# Patient Record
Sex: FEMALE | Race: WHITE | NOT HISPANIC OR LATINO | URBAN - METROPOLITAN AREA
[De-identification: names, ages, dates, MRNs, and addresses within clinical notes are randomized per-mention and may not be internally consistent; named-entity substitution may affect disease eponyms.]

---

## 2017-02-23 ENCOUNTER — OUTPATIENT (OUTPATIENT)
Dept: OUTPATIENT SERVICES | Facility: HOSPITAL | Age: 52
LOS: 1 days | End: 2017-02-23

## 2017-02-27 DIAGNOSIS — Z01.20 ENCOUNTER FOR DENTAL EXAMINATION AND CLEANING WITHOUT ABNORMAL FINDINGS: ICD-10-CM

## 2017-05-12 ENCOUNTER — OUTPATIENT (OUTPATIENT)
Dept: OUTPATIENT SERVICES | Facility: HOSPITAL | Age: 52
LOS: 1 days | End: 2017-05-12

## 2017-05-18 DIAGNOSIS — Z01.20 ENCOUNTER FOR DENTAL EXAMINATION AND CLEANING WITHOUT ABNORMAL FINDINGS: ICD-10-CM

## 2017-07-06 ENCOUNTER — OUTPATIENT (OUTPATIENT)
Dept: OUTPATIENT SERVICES | Facility: HOSPITAL | Age: 52
LOS: 1 days | End: 2017-07-06

## 2017-07-07 DIAGNOSIS — Z01.20 ENCOUNTER FOR DENTAL EXAMINATION AND CLEANING WITHOUT ABNORMAL FINDINGS: ICD-10-CM

## 2020-07-09 ENCOUNTER — APPOINTMENT (OUTPATIENT)
Dept: HEMATOLOGY ONCOLOGY | Facility: CLINIC | Age: 55
End: 2020-07-09
Payer: MEDICARE

## 2020-07-09 ENCOUNTER — RESULT REVIEW (OUTPATIENT)
Age: 55
End: 2020-07-09

## 2020-07-09 VITALS
RESPIRATION RATE: 18 BRPM | WEIGHT: 93.2 LBS | HEIGHT: 62.4 IN | HEART RATE: 64 BPM | BODY MASS INDEX: 16.93 KG/M2 | SYSTOLIC BLOOD PRESSURE: 94 MMHG | OXYGEN SATURATION: 97 % | DIASTOLIC BLOOD PRESSURE: 55 MMHG | TEMPERATURE: 97.6 F

## 2020-07-09 DIAGNOSIS — Z80.52 FAMILY HISTORY OF MALIGNANT NEOPLASM OF BLADDER: ICD-10-CM

## 2020-07-09 DIAGNOSIS — Z82.49 FAMILY HISTORY OF ISCHEMIC HEART DISEASE AND OTHER DISEASES OF THE CIRCULATORY SYSTEM: ICD-10-CM

## 2020-07-09 DIAGNOSIS — T78.40XA ALLERGY, UNSPECIFIED, INITIAL ENCOUNTER: ICD-10-CM

## 2020-07-09 DIAGNOSIS — Z87.891 PERSONAL HISTORY OF NICOTINE DEPENDENCE: ICD-10-CM

## 2020-07-09 DIAGNOSIS — Z00.00 ENCOUNTER FOR GENERAL ADULT MEDICAL EXAMINATION W/OUT ABNORMAL FINDINGS: ICD-10-CM

## 2020-07-09 DIAGNOSIS — Z80.8 FAMILY HISTORY OF MALIGNANT NEOPLASM OF OTHER ORGANS OR SYSTEMS: ICD-10-CM

## 2020-07-09 DIAGNOSIS — Z82.0 FAMILY HISTORY OF EPILEPSY AND OTHER DISEASES OF THE NERVOUS SYSTEM: ICD-10-CM

## 2020-07-09 PROCEDURE — 99205 OFFICE O/P NEW HI 60 MIN: CPT

## 2020-07-13 DIAGNOSIS — R92.2 INCONCLUSIVE MAMMOGRAM: ICD-10-CM

## 2020-07-24 NOTE — ASSESSMENT
[FreeTextEntry1] : 54 year old female diagnosed with CLL stage "0" with average count of 35k, denies fever, occasional night sweats, most likely menopausal, weigh decline intermittently but variable due to her eating disorder.\par \par She c/o fatigue, feels depressed. \par \par She gets bouts of nausea, vertigo since 2014. \par Plan:\par - CBC, immunoglobulins, Copper and ZInc levels.

## 2020-07-24 NOTE — HISTORY OF PRESENT ILLNESS
[Disease:__________________________] : Disease: [unfilled] [de-identified] : 54 year old female presents today for initial consultation of CLL, transfer of care from Dr. Mcguire.  Patient was diagnosed with CLL, Gomes stage 0 in .  Patient presented to MD upon diagnosis to PCP,  as she was feeling very tired and fatigued and her WBC continually was elevated on several occasions.  She was then referred to hematologist and diagnosed with CLL.  She never had BM BX- or imagining studies.  Last CBC 2020- WNL? Pertinent positives include night sweats, no unexplained weight loss, no recent infections.  \par \par Patient has a history of anorexia and OP- was not able to tolerate Fosamax, last BMD was within 2 years.  \par \par Fathers was adopted- unknown family history, mother  from brain bleed after being on anticoagulation for a provoked DVT, also has  history of rectal and bladder cancer.\par \par Patient c/o fatigue, hair loss.  Feels " not herself", decrease stamina, Patient with anorexia nervosa for many years since 80-ties, was  told she prediabetic.  Weight decreased in the last 6 months, not under the care of PCP as just moved to [place] 1 month ago.

## 2020-08-04 ENCOUNTER — RESULT REVIEW (OUTPATIENT)
Age: 55
End: 2020-08-04

## 2020-08-06 ENCOUNTER — RESULT REVIEW (OUTPATIENT)
Age: 55
End: 2020-08-06

## 2020-08-06 ENCOUNTER — APPOINTMENT (OUTPATIENT)
Dept: HEMATOLOGY ONCOLOGY | Facility: CLINIC | Age: 55
End: 2020-08-06
Payer: MEDICARE

## 2020-08-06 VITALS
WEIGHT: 92.29 LBS | BODY MASS INDEX: 16.77 KG/M2 | DIASTOLIC BLOOD PRESSURE: 62 MMHG | TEMPERATURE: 97.1 F | RESPIRATION RATE: 20 BRPM | HEART RATE: 60 BPM | HEIGHT: 62.4 IN | OXYGEN SATURATION: 100 % | SYSTOLIC BLOOD PRESSURE: 99 MMHG

## 2020-08-06 PROCEDURE — 99214 OFFICE O/P EST MOD 30 MIN: CPT

## 2020-08-07 NOTE — HISTORY OF PRESENT ILLNESS
[Disease:__________________________] : Disease: [unfilled] [de-identified] : 54 year old female presents today for initial consultation of CLL, transfer of care from Dr. Mcguire.  Patient was diagnosed with CLL, Gomes stage 0 in .  Patient presented to MD upon diagnosis to PCP,  as she was feeling very tired and fatigued and her WBC continually was elevated on several occasions.  She was then referred to hematologist and diagnosed with CLL.  She never had BM BX- or imagining studies.  Last CBC 2020- WNL? Pertinent positives include night sweats, no unexplained weight loss, no recent infections.  \par \par Patient has a history of anorexia and OP- was not able to tolerate Fosamax, last BMD was within 2 years.  \par \par Fathers was adopted- unknown family history, mother  from brain bleed after being on anticoagulation for a provoked DVT, also has  history of rectal and bladder cancer.\par \par Patient c/o fatigue, hair loss.  Feels " not herself", decrease stamina, Patient with anorexia nervosa for many years since 80-ties, was  told she prediabetic.  Weight decreased in the last 6 months, not under the care of PCP as just moved to [place] 1 month ago.  [de-identified] : Patient presents today for follow up of CLL.  \par \par Mammogram 8/4/2020- no evidence of malignancy needed recall due to bilateral axillary LN's- she was recalled today and was told it was negative.  \par \par Thyroid US- recommend biopsy for nodules

## 2020-08-07 NOTE — ASSESSMENT
[FreeTextEntry1] : 54 year old female diagnosed with CLL stage "0" with average count of 35k, denies fever, occasional night sweats, most likely menopausal, weigh decline intermittently but variable due to her eating disorder.\par Patient diagnosed 2014\par She c/o fatigue, feels depressed. \par FISH - 13q14 deletion, reviewed with patient results, overall good prognostic profile.\par WBC 40 k with ALC 35k and stable remaining heme parameters.\par Continue to monitor the counts q 4-6 months.\par \par PCP referral, to update for immunization. \par \par Thyroid nodules- endocrinology evaluation.\par \par

## 2020-08-11 ENCOUNTER — APPOINTMENT (OUTPATIENT)
Dept: ENDOCRINOLOGY | Facility: CLINIC | Age: 55
End: 2020-08-11
Payer: MEDICARE

## 2020-08-11 VITALS
HEIGHT: 62 IN | OXYGEN SATURATION: 99 % | DIASTOLIC BLOOD PRESSURE: 60 MMHG | WEIGHT: 95 LBS | TEMPERATURE: 97.9 F | SYSTOLIC BLOOD PRESSURE: 100 MMHG | HEART RATE: 63 BPM | BODY MASS INDEX: 17.48 KG/M2

## 2020-08-11 DIAGNOSIS — Z87.39 PERSONAL HISTORY OF OTHER DISEASES OF THE MUSCULOSKELETAL SYSTEM AND CONNECTIVE TISSUE: ICD-10-CM

## 2020-08-11 PROCEDURE — 99205 OFFICE O/P NEW HI 60 MIN: CPT

## 2020-08-11 RX ORDER — CLONAZEPAM 0.5 MG/1
0.5 TABLET ORAL
Qty: 60 | Refills: 0 | Status: ACTIVE | COMMUNITY
Start: 2020-07-29

## 2020-08-25 ENCOUNTER — APPOINTMENT (OUTPATIENT)
Dept: INTERNAL MEDICINE | Facility: CLINIC | Age: 55
End: 2020-08-25

## 2020-09-02 ENCOUNTER — APPOINTMENT (OUTPATIENT)
Dept: INTERNAL MEDICINE | Facility: CLINIC | Age: 55
End: 2020-09-02
Payer: MEDICARE

## 2020-09-02 VITALS
SYSTOLIC BLOOD PRESSURE: 104 MMHG | BODY MASS INDEX: 17.02 KG/M2 | DIASTOLIC BLOOD PRESSURE: 70 MMHG | WEIGHT: 92.5 LBS | HEIGHT: 62 IN

## 2020-09-02 DIAGNOSIS — H81.10 BENIGN PAROXYSMAL VERTIGO, UNSPECIFIED EAR: ICD-10-CM

## 2020-09-02 LAB
24R-OH-CALCIDIOL SERPL-MCNC: 79.3 PG/ML
25(OH)D3 SERPL-MCNC: 53.2 NG/ML
ALBUMIN MFR SERPL ELPH: 68.4 %
ALBUMIN SERPL ELPH-MCNC: 4.8 G/DL
ALBUMIN SERPL-MCNC: 4.5 G/DL
ALBUMIN/GLOB SERPL: 2.1 RATIO
ALP BLD-CCNC: 73 U/L
ALPHA1 GLOB MFR SERPL ELPH: 4 %
ALPHA1 GLOB SERPL ELPH-MCNC: 0.3 G/DL
ALPHA2 GLOB MFR SERPL ELPH: 8.3 %
ALPHA2 GLOB SERPL ELPH-MCNC: 0.5 G/DL
ALT SERPL-CCNC: 52 U/L
ANION GAP SERPL CALC-SCNC: 11 MMOL/L
AST SERPL-CCNC: 44 U/L
B-GLOBULIN MFR SERPL ELPH: 9.5 %
B-GLOBULIN SERPL ELPH-MCNC: 0.6 G/DL
BILIRUB SERPL-MCNC: 0.4 MG/DL
BUN SERPL-MCNC: 9 MG/DL
CALCIUM SERPL-MCNC: 9.7 MG/DL
CALCIUM SERPL-MCNC: 9.7 MG/DL
CHLORIDE SERPL-SCNC: 99 MMOL/L
CHOLEST SERPL-MCNC: 219 MG/DL
CHOLEST/HDLC SERPL: 3 RATIO
CO2 SERPL-SCNC: 29 MMOL/L
COLLAGEN NTX UR-SCNC: 47
CREAT SERPL-MCNC: 0.61 MG/DL
CREAT UR-MCNC: 16 MG/DL
ESTIMATED AVERAGE GLUCOSE: 117 MG/DL
GAMMA GLOB FLD ELPH-MCNC: 0.6 G/DL
GAMMA GLOB MFR SERPL ELPH: 9.8 %
GLUCOSE SERPL-MCNC: 93 MG/DL
HBA1C MFR BLD HPLC: 5.7 %
HDLC SERPL-MCNC: 72 MG/DL
INTERPRETATION SERPL IEP-IMP: NORMAL
LDLC SERPL CALC-MCNC: 127 MG/DL
MAGNESIUM SERPL-MCNC: 2.2 MG/DL
PARATHYROID HORMONE INTACT: 19 PG/ML
PHOSPHATE SERPL-MCNC: 3.6 MG/DL
POTASSIUM SERPL-SCNC: 4.2 MMOL/L
PROT SERPL-MCNC: 6.6 G/DL
SODIUM SERPL-SCNC: 139 MMOL/L
THYROGLOB AB SERPL-ACNC: <20 IU/ML
THYROPEROXIDASE AB SERPL IA-ACNC: 11.4 IU/ML
TRIGL SERPL-MCNC: 99 MG/DL
TSH SERPL-ACNC: 1.82 UIU/ML

## 2020-09-02 PROCEDURE — 99203 OFFICE O/P NEW LOW 30 MIN: CPT

## 2020-09-02 NOTE — REVIEW OF SYSTEMS
[Fatigue] : fatigue [Abdominal Pain] : abdominal pain [Palpitations] : palpitations [Constipation] : constipation [Heartburn] : heartburn [Polyuria] : polyuria [Diarrhea] : diarrhea [Muscle Cramps] : muscle cramps [Back Pain] : back pain [Dry Skin] : dry skin [Depression] : depression [Hair Loss] : hair loss [Insomnia] : insomnia [Hot Flashes] : hot flashes [Anxiety] : anxiety [Easy Bruising] : a tendency for easy bruising [Negative] : Endocrine [FreeTextEntry5] : after eating sugar per pt seen cardiology [FreeTextEntry3] : ringing years vertigo  [FreeTextEntry7] : IBS [FreeTextEntry6] : has asthma [de-identified] : sweats

## 2020-09-02 NOTE — REASON FOR VISIT
[Thyroid nodule/ MNG] : thyroid nodule/ MNG [Initial Evaluation] : an initial evaluation [FreeTextEntry2] : Cheryl

## 2020-09-02 NOTE — PHYSICAL EXAM
[Alert] : alert [Well Nourished] : well nourished [No Acute Distress] : no acute distress [Well Developed] : well developed [EOMI] : extra ocular movement intact [Normal Sclera/Conjunctiva] : normal sclera/conjunctiva [No Proptosis] : no proptosis [Normal Oropharynx] : the oropharynx was normal [No Respiratory Distress] : no respiratory distress [No Accessory Muscle Use] : no accessory muscle use [Clear to Auscultation] : lungs were clear to auscultation bilaterally [Normal S1, S2] : normal S1 and S2 [Normal Rate] : heart rate was normal [Regular Rhythm] : with a regular rhythm [No Edema] : no peripheral edema [Pedal Pulses Normal] : the pedal pulses are present [Not Tender] : non-tender [Normal Bowel Sounds] : normal bowel sounds [Not Distended] : not distended [Soft] : abdomen soft [Normal Anterior Cervical Nodes] : no anterior cervical lymphadenopathy [Normal Posterior Cervical Nodes] : no posterior cervical lymphadenopathy [Spine Straight] : spine straight [No Spinal Tenderness] : no spinal tenderness [Normal Gait] : normal gait [No Stigmata of Cushings Syndrome] : no stigmata of Cushings Syndrome [Normal Strength/Tone] : muscle strength and tone were normal [No Rash] : no rash [Normal Reflexes] : deep tendon reflexes were 2+ and symmetric [No Tremors] : no tremors [Oriented x3] : oriented to person, place, and time [Acanthosis Nigricans] : no acanthosis nigricans [de-identified] : anxious looking [de-identified] : enlarged multinodular thyroid
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
## 2020-09-02 NOTE — CONSULT LETTER
[Dear  ___] : Dear  [unfilled], [Consult Letter:] : I had the pleasure of evaluating your patient, [unfilled]. [Please see my note below.] : Please see my note below. [Consult Closing:] : Thank you very much for allowing me to participate in the care of this patient.  If you have any questions, please do not hesitate to contact me. [Sincerely,] : Sincerely, [FreeTextEntry3] : Brenda Verdin MD

## 2020-09-03 ENCOUNTER — APPOINTMENT (OUTPATIENT)
Dept: ENDOCRINOLOGY | Facility: CLINIC | Age: 55
End: 2020-09-03
Payer: MEDICARE

## 2020-09-03 VITALS
BODY MASS INDEX: 16.93 KG/M2 | WEIGHT: 92 LBS | SYSTOLIC BLOOD PRESSURE: 104 MMHG | HEIGHT: 62 IN | DIASTOLIC BLOOD PRESSURE: 70 MMHG

## 2020-09-03 VITALS
WEIGHT: 95 LBS | BODY MASS INDEX: 17.48 KG/M2 | SYSTOLIC BLOOD PRESSURE: 96 MMHG | HEIGHT: 62 IN | DIASTOLIC BLOOD PRESSURE: 62 MMHG | TEMPERATURE: 97.4 F | HEART RATE: 66 BPM

## 2020-09-03 DIAGNOSIS — E04.1 NONTOXIC SINGLE THYROID NODULE: ICD-10-CM

## 2020-09-03 PROBLEM — H81.10 BPV (BENIGN POSITIONAL VERTIGO): Status: ACTIVE | Noted: 2020-07-08

## 2020-09-03 PROCEDURE — 99215 OFFICE O/P EST HI 40 MIN: CPT

## 2020-09-03 NOTE — PHYSICAL EXAM
[Alert] : alert [Well Nourished] : well nourished [No Acute Distress] : no acute distress [Well Developed] : well developed [Normal Sclera/Conjunctiva] : normal sclera/conjunctiva [EOMI] : extra ocular movement intact [No Proptosis] : no proptosis [No Respiratory Distress] : no respiratory distress [Normal Oropharynx] : the oropharynx was normal [No Accessory Muscle Use] : no accessory muscle use [Clear to Auscultation] : lungs were clear to auscultation bilaterally [Normal S1, S2] : normal S1 and S2 [Normal Rate] : heart rate was normal [Regular Rhythm] : with a regular rhythm [No Edema] : no peripheral edema [Pedal Pulses Normal] : the pedal pulses are present [Normal Bowel Sounds] : normal bowel sounds [Not Tender] : non-tender [Not Distended] : not distended [Normal Anterior Cervical Nodes] : no anterior cervical lymphadenopathy [Soft] : abdomen soft [No Spinal Tenderness] : no spinal tenderness [Spine Straight] : spine straight [No Stigmata of Cushings Syndrome] : no stigmata of Cushings Syndrome [Normal Gait] : normal gait [Normal Strength/Tone] : muscle strength and tone were normal [No Rash] : no rash [Oriented x3] : oriented to person, place, and time [Normal Reflexes] : deep tendon reflexes were 2+ and symmetric [No Tremors] : no tremors [Acanthosis Nigricans] : no acanthosis nigricans [de-identified] : enlarged multinodular thyroid  [de-identified] : anxious looking

## 2020-09-03 NOTE — PHYSICAL EXAM
[Well Developed] : well developed [No Acute Distress] : no acute distress [Well Nourished] : well nourished [Normal Sclera/Conjunctiva] : normal sclera/conjunctiva [PERRL] : pupils equal round and reactive to light [Well-Appearing] : well-appearing [EOMI] : extraocular movements intact [Normal Outer Ear/Nose] : the outer ears and nose were normal in appearance [Normal Oropharynx] : the oropharynx was normal [No Lymphadenopathy] : no lymphadenopathy [No JVD] : no jugular venous distention [No Respiratory Distress] : no respiratory distress  [Thyroid Normal, No Nodules] : the thyroid was normal and there were no nodules present [Supple] : supple [No Accessory Muscle Use] : no accessory muscle use [Clear to Auscultation] : lungs were clear to auscultation bilaterally [Regular Rhythm] : with a regular rhythm [Normal Rate] : normal rate  [No Carotid Bruits] : no carotid bruits [Normal S1, S2] : normal S1 and S2 [No Murmur] : no murmur heard [Pedal Pulses Present] : the pedal pulses are present [No Abdominal Bruit] : a ~M bruit was not heard ~T in the abdomen [No Varicosities] : no varicosities [No Palpable Aorta] : no palpable aorta [No Edema] : there was no peripheral edema [No Extremity Clubbing/Cyanosis] : no extremity clubbing/cyanosis [Soft] : abdomen soft [Non-distended] : non-distended [Non Tender] : non-tender [No Masses] : no abdominal mass palpated [Normal Bowel Sounds] : normal bowel sounds [No HSM] : no HSM [Normal Posterior Cervical Nodes] : no posterior cervical lymphadenopathy [Normal Anterior Cervical Nodes] : no anterior cervical lymphadenopathy [No CVA Tenderness] : no CVA  tenderness [No Spinal Tenderness] : no spinal tenderness [No Joint Swelling] : no joint swelling [Grossly Normal Strength/Tone] : grossly normal strength/tone [No Rash] : no rash [No Focal Deficits] : no focal deficits [Normal Gait] : normal gait [Coordination Grossly Intact] : coordination grossly intact [Normal Affect] : the affect was normal [Normal Insight/Judgement] : insight and judgment were intact [Alert and Oriented x3] : oriented to person, place, and time [de-identified] : anxious

## 2020-09-03 NOTE — HISTORY OF PRESENT ILLNESS
[FreeTextEntry8] : Initial visit for 55-year-old female with, CLL, eating disorder, very anxious, presents to establish care. She also sees Dr Verdin for multi-nodular goiter, she had thyroid biopsy yesterday. Pt is teary because she thinks there is something wrong with her car. She feels well, denies chest pain, sob, palp, dizziness.\par She wants to get off Requip, states she never had restless leg that a doctor in [place] gave it to her because she c/o knee pain.\par She is requesting referrals to multiple specialists - Derm for skin check, ENT for vertigo, GI for colonoscopy

## 2020-09-03 NOTE — COUNSELING
[Encouraged to increase physical activity] : Encouraged to increase physical activity [FreeTextEntry2] : Refuses Ensure/Boost because when she was hospitalized it was used as punishment

## 2020-09-03 NOTE — REVIEW OF SYSTEMS
[Fatigue] : fatigue [Palpitations] : palpitations [Constipation] : constipation [Heartburn] : heartburn [Abdominal Pain] : abdominal pain [Diarrhea] : diarrhea [Polyuria] : polyuria [Muscle Cramps] : muscle cramps [Back Pain] : back pain [Hair Loss] : hair loss [Dry Skin] : dry skin [Depression] : depression [Anxiety] : anxiety [Insomnia] : insomnia [Hot Flashes] : hot flashes [Easy Bruising] : a tendency for easy bruising [Negative] : Heme/Lymph [FreeTextEntry3] : ringing years vertigo  [FreeTextEntry5] : after eating sugar per pt seen cardiology [FreeTextEntry6] : has asthma [FreeTextEntry7] : IBS [de-identified] : sweats
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
## 2020-09-03 NOTE — PLAN
[FreeTextEntry1] : 56 y/o female as noted. Referred to providers as requested. She will make appt. for cpx for Oct at which time she will also get her flu vaccine\par Advised to taper Requip then D/C, continue other meds as ordered

## 2020-09-11 ENCOUNTER — TRANSCRIPTION ENCOUNTER (OUTPATIENT)
Age: 55
End: 2020-09-11

## 2020-09-15 ENCOUNTER — TRANSCRIPTION ENCOUNTER (OUTPATIENT)
Age: 55
End: 2020-09-15

## 2020-09-16 ENCOUNTER — TRANSCRIPTION ENCOUNTER (OUTPATIENT)
Age: 55
End: 2020-09-16

## 2020-09-17 ENCOUNTER — TRANSCRIPTION ENCOUNTER (OUTPATIENT)
Age: 55
End: 2020-09-17

## 2020-09-18 ENCOUNTER — TRANSCRIPTION ENCOUNTER (OUTPATIENT)
Age: 55
End: 2020-09-18

## 2020-09-21 ENCOUNTER — TRANSCRIPTION ENCOUNTER (OUTPATIENT)
Age: 55
End: 2020-09-21

## 2020-09-23 ENCOUNTER — TRANSCRIPTION ENCOUNTER (OUTPATIENT)
Age: 55
End: 2020-09-23

## 2020-09-30 ENCOUNTER — TRANSCRIPTION ENCOUNTER (OUTPATIENT)
Age: 55
End: 2020-09-30

## 2020-09-30 ENCOUNTER — APPOINTMENT (OUTPATIENT)
Dept: GASTROENTEROLOGY | Facility: CLINIC | Age: 55
End: 2020-09-30
Payer: MEDICARE

## 2020-09-30 VITALS
WEIGHT: 95 LBS | SYSTOLIC BLOOD PRESSURE: 86 MMHG | HEIGHT: 62 IN | DIASTOLIC BLOOD PRESSURE: 56 MMHG | BODY MASS INDEX: 17.48 KG/M2

## 2020-09-30 DIAGNOSIS — K59.01 SLOW TRANSIT CONSTIPATION: ICD-10-CM

## 2020-09-30 DIAGNOSIS — R74.01 ELEVATION OF LEVELS OF LIVER TRANSAMINASE LEVELS: ICD-10-CM

## 2020-09-30 DIAGNOSIS — Z12.11 ENCOUNTER FOR SCREENING FOR MALIGNANT NEOPLASM OF COLON: ICD-10-CM

## 2020-09-30 PROCEDURE — 99205 OFFICE O/P NEW HI 60 MIN: CPT

## 2020-09-30 RX ORDER — ROPINIROLE 0.25 MG/1
0.25 TABLET, FILM COATED ORAL
Refills: 0 | Status: DISCONTINUED | COMMUNITY
End: 2020-09-30

## 2020-09-30 NOTE — ASSESSMENT
[FreeTextEntry1] : Colon cancer screening\par obtain previous colonoscopy records, patient has family history of rectal cancer and should have colonoscopy at minimum every 5 years. \par \par Transaminitis- mild\par check labs/US r/o autoimmune hepatitis\par \par Constipation daily BM with sense of incomplete evacuation, recommend she slowly increase benefiber. She will add additional teaspoon at night. Continue daily MiraLAX\par \par return to office in 1 month

## 2020-09-30 NOTE — HISTORY OF PRESENT ILLNESS
[FreeTextEntry1] : 55 year F CLL, anxiety, osteoporosis, preDM2, IBS, thyroid nodule, knee pain, family h/o rectal ca (mother) h/o anorexia\par dx with COVID 19 in 09/2020, just completed quarantine. \par Patient seen at the request of ALEX Mckeon for the evaluation of Gi issues. \par \par Last colonoscopy in 2017 in [place]- told she had a tortuous colon.\par She has been taking MiraLAX daily for many years, she has 2-3 bm per day and has chronic sense of incomplete evacuation. \par She takes 2 teaspoons of benefiber daily and drinks 9 glasses of water per day. She has 2 eggs daily., 1cup of decaf tea, 1 cup of almond milk, eats fruits and veges during the day.\par She has done pelvic floor PT/colon massage in [place] and has also found this to be somewhat helpful. \par \par She has had reflux in the past, this is not currently an active issue for her. SHe has had diarrhea with PPIs in the past. \par She has vertigo and this is associated with nausea. \par no vomiting. \par she has occasional abd pain. \par no brbpr/melena. \par her weight is stable. \par Denies regular NSAID use. \par \par Recently told she had transaminitis\par labs 09/2020 AST/ALT 32/32\par labs 08/2020 AST 44/ALT 52\par \par She denies h/o EtOH

## 2020-10-01 DIAGNOSIS — K59.09 OTHER CONSTIPATION: ICD-10-CM

## 2020-10-01 LAB
ALBUMIN SERPL ELPH-MCNC: 4.6 G/DL
ALP BLD-CCNC: 84 U/L
ALT SERPL-CCNC: 24 U/L
ANION GAP SERPL CALC-SCNC: 12 MMOL/L
AST SERPL-CCNC: 25 U/L
BILIRUB SERPL-MCNC: 0.3 MG/DL
BUN SERPL-MCNC: 16 MG/DL
CALCIUM SERPL-MCNC: 9.8 MG/DL
CERULOPLASMIN SERPL-MCNC: 28 MG/DL
CHLORIDE SERPL-SCNC: 101 MMOL/L
CO2 SERPL-SCNC: 28 MMOL/L
CREAT SERPL-MCNC: 0.62 MG/DL
ENDOMYSIUM IGA SER QL: NEGATIVE
ENDOMYSIUM IGA TITR SER: NORMAL
FERRITIN SERPL-MCNC: 81 NG/ML
GLIADIN IGA SER QL: <5 UNITS
GLIADIN IGG SER QL: <5 UNITS
GLIADIN PEPTIDE IGA SER-ACNC: NEGATIVE
GLIADIN PEPTIDE IGG SER-ACNC: NEGATIVE
HAV IGM SER QL: NONREACTIVE
HBV CORE IGM SER QL: NONREACTIVE
HBV SURFACE AG SER QL: NONREACTIVE
HCV AB SER QL: NONREACTIVE
HCV S/CO RATIO: 0.14 S/CO
IGA SER QL IEP: 39 MG/DL
INR PPP: 1.09 RATIO
IRON SATN MFR SERPL: 33 %
IRON SERPL-MCNC: 112 UG/DL
POTASSIUM SERPL-SCNC: 4.3 MMOL/L
PROT SERPL-MCNC: 6.8 G/DL
PT BLD: 12.9 SEC
SMOOTH MUSCLE AB SER QL IF: NORMAL
SODIUM SERPL-SCNC: 141 MMOL/L
TIBC SERPL-MCNC: 344 UG/DL
TTG IGA SER IA-ACNC: <1.2 U/ML
TTG IGA SER-ACNC: NEGATIVE
TTG IGG SER IA-ACNC: 3.5 U/ML
TTG IGG SER IA-ACNC: NEGATIVE
UIBC SERPL-MCNC: 232 UG/DL

## 2020-10-01 RX ORDER — POLYETHYLENE GLYCOL 3350 17 G/17G
17 POWDER, FOR SOLUTION ORAL DAILY
Qty: 1 | Refills: 3 | Status: ACTIVE | COMMUNITY
Start: 2020-10-01 | End: 1900-01-01

## 2020-10-02 LAB
ANA SER IF-ACNC: NEGATIVE
MITOCHONDRIA AB SER IF-ACNC: NORMAL

## 2020-10-04 LAB — LKM AB SER QL IF: <20.1 UNITS

## 2020-10-05 ENCOUNTER — TRANSCRIPTION ENCOUNTER (OUTPATIENT)
Age: 55
End: 2020-10-05

## 2020-10-05 ENCOUNTER — RESULT REVIEW (OUTPATIENT)
Age: 55
End: 2020-10-05

## 2020-10-07 ENCOUNTER — APPOINTMENT (OUTPATIENT)
Dept: OBGYN | Facility: CLINIC | Age: 55
End: 2020-10-07
Payer: MEDICARE

## 2020-10-07 ENCOUNTER — RX CHANGE (OUTPATIENT)
Age: 55
End: 2020-10-07

## 2020-10-07 VITALS
DIASTOLIC BLOOD PRESSURE: 60 MMHG | SYSTOLIC BLOOD PRESSURE: 100 MMHG | WEIGHT: 94 LBS | BODY MASS INDEX: 17.3 KG/M2 | TEMPERATURE: 98 F | HEIGHT: 62 IN

## 2020-10-07 DIAGNOSIS — B37.3 CANDIDIASIS OF VULVA AND VAGINA: ICD-10-CM

## 2020-10-07 DIAGNOSIS — D18.03 HEMANGIOMA OF INTRA-ABDOMINAL STRUCTURES: ICD-10-CM

## 2020-10-07 DIAGNOSIS — R30.0 DYSURIA: ICD-10-CM

## 2020-10-07 DIAGNOSIS — Z87.39 PERSONAL HISTORY OF OTHER DISEASES OF THE MUSCULOSKELETAL SYSTEM AND CONNECTIVE TISSUE: ICD-10-CM

## 2020-10-07 LAB
BILIRUB UR QL STRIP: NORMAL
CLARITY UR: NORMAL
COLLECTION METHOD: NORMAL
GLUCOSE UR-MCNC: NORMAL
HCG UR QL: 0.2 EU/DL
HGB UR QL STRIP.AUTO: NORMAL
IGG SUBSET TOTAL IGG: 832 MG/DL
IGG1 SER-MCNC: 439 MG/DL
IGG2 SER-MCNC: 214 MG/DL
IGG3 SER-MCNC: 43 MG/DL
IGG4 SER-MCNC: 8 MG/DL
KETONES UR-MCNC: NORMAL
LEUKOCYTE ESTERASE UR QL STRIP: NORMAL
NITRITE UR QL STRIP: NORMAL
PH UR STRIP: 7
PROT UR STRIP-MCNC: NORMAL
SP GR UR STRIP: 1.02

## 2020-10-07 PROCEDURE — 99202 OFFICE O/P NEW SF 15 MIN: CPT

## 2020-10-07 PROCEDURE — 99213 OFFICE O/P EST LOW 20 MIN: CPT

## 2020-10-07 PROCEDURE — 81003 URINALYSIS AUTO W/O SCOPE: CPT | Mod: QW

## 2020-10-07 RX ORDER — LORATADINE 10 MG
17 TABLET,DISINTEGRATING ORAL
Refills: 0 | Status: ACTIVE | COMMUNITY

## 2020-10-07 RX ORDER — BETAMETHASONE DIPROPIONATE 0.5 MG/G
0.05 CREAM TOPICAL TWICE DAILY
Qty: 1 | Refills: 1 | Status: ACTIVE | COMMUNITY
Start: 2020-10-07

## 2020-10-07 RX ORDER — CLOTRIMAZOLE 10 MG/G
1 CREAM TOPICAL TWICE DAILY
Qty: 1 | Refills: 1 | Status: ACTIVE | COMMUNITY
Start: 2020-10-07

## 2020-10-07 RX ORDER — CLOTRIMAZOLE AND BETAMETHASONE DIPROPIONATE 10; .5 MG/G; MG/G
1-0.05 CREAM TOPICAL
Qty: 1 | Refills: 0 | Status: DISCONTINUED | COMMUNITY
Start: 2020-10-07 | End: 2020-10-07

## 2020-10-07 NOTE — PHYSICAL EXAM
[Awake] : awake [Alert] : alert [Normal Mental Status] : the patient was oriented to person, place and time [Appropriate] : appropriate [Vulvar Atrophy] : vulvar atrophy [Labia Minora Erythema] : erythema of the labia minora [Acute Distress] : no acute distress [Labia Majora Erythema] : no erythema of the labia majora

## 2020-10-08 ENCOUNTER — APPOINTMENT (OUTPATIENT)
Dept: HEMATOLOGY ONCOLOGY | Facility: CLINIC | Age: 55
End: 2020-10-08
Payer: MEDICARE

## 2020-10-08 ENCOUNTER — RESULT REVIEW (OUTPATIENT)
Age: 55
End: 2020-10-08

## 2020-10-08 LAB
CANDIDA VAG CYTO: NOT DETECTED
G VAGINALIS+PREV SP MTYP VAG QL MICRO: NOT DETECTED
T VAGINALIS VAG QL WET PREP: NOT DETECTED

## 2020-10-08 PROCEDURE — 99499A: CUSTOM | Mod: NC

## 2020-10-09 ENCOUNTER — TRANSCRIPTION ENCOUNTER (OUTPATIENT)
Age: 55
End: 2020-10-09

## 2020-10-14 ENCOUNTER — LABORATORY RESULT (OUTPATIENT)
Age: 55
End: 2020-10-14

## 2020-10-14 ENCOUNTER — APPOINTMENT (OUTPATIENT)
Dept: INTERNAL MEDICINE | Facility: CLINIC | Age: 55
End: 2020-10-14
Payer: MEDICARE

## 2020-10-14 ENCOUNTER — NON-APPOINTMENT (OUTPATIENT)
Age: 55
End: 2020-10-14

## 2020-10-14 VITALS
HEIGHT: 62 IN | WEIGHT: 94 LBS | DIASTOLIC BLOOD PRESSURE: 65 MMHG | BODY MASS INDEX: 17.3 KG/M2 | SYSTOLIC BLOOD PRESSURE: 102 MMHG | TEMPERATURE: 98.1 F

## 2020-10-14 DIAGNOSIS — R73.03 PREDIABETES.: ICD-10-CM

## 2020-10-14 DIAGNOSIS — Z23 ENCOUNTER FOR IMMUNIZATION: ICD-10-CM

## 2020-10-14 DIAGNOSIS — Z20.828 CONTACT WITH AND (SUSPECTED) EXPOSURE TO OTHER VIRAL COMMUNICABLE DISEASES: ICD-10-CM

## 2020-10-14 PROCEDURE — 90686 IIV4 VACC NO PRSV 0.5 ML IM: CPT

## 2020-10-14 PROCEDURE — 99214 OFFICE O/P EST MOD 30 MIN: CPT | Mod: CS,25

## 2020-10-14 PROCEDURE — 93000 ELECTROCARDIOGRAM COMPLETE: CPT

## 2020-10-14 PROCEDURE — 36415 COLL VENOUS BLD VENIPUNCTURE: CPT

## 2020-10-14 PROCEDURE — G0008: CPT

## 2020-10-14 RX ORDER — FOLIC ACID 1 MG/1
1 TABLET ORAL DAILY
Qty: 90 | Refills: 3 | Status: ACTIVE | COMMUNITY
Start: 1900-01-01 | End: 1900-01-01

## 2020-10-15 ENCOUNTER — RESULT REVIEW (OUTPATIENT)
Age: 55
End: 2020-10-15

## 2020-10-15 ENCOUNTER — APPOINTMENT (OUTPATIENT)
Dept: HEMATOLOGY ONCOLOGY | Facility: CLINIC | Age: 55
End: 2020-10-15
Payer: MEDICARE

## 2020-10-15 VITALS
WEIGHT: 94.12 LBS | TEMPERATURE: 97.9 F | SYSTOLIC BLOOD PRESSURE: 86 MMHG | DIASTOLIC BLOOD PRESSURE: 53 MMHG | HEART RATE: 62 BPM | HEIGHT: 62 IN | RESPIRATION RATE: 20 BRPM | OXYGEN SATURATION: 98 % | BODY MASS INDEX: 17.32 KG/M2

## 2020-10-15 DIAGNOSIS — M81.0 AGE-RELATED OSTEOPOROSIS W/OUT CURRENT PATHOLOGICAL FRACTURE: ICD-10-CM

## 2020-10-15 PROBLEM — R73.03 PREDIABETES: Status: ACTIVE | Noted: 2020-08-11

## 2020-10-15 LAB
APPEARANCE: CLEAR
BASOPHILS # BLD AUTO: 0.12 K/UL
BASOPHILS NFR BLD AUTO: 0.3 %
BILIRUBIN URINE: NEGATIVE
BLOOD URINE: NEGATIVE
CHOLEST SERPL-MCNC: 194 MG/DL
CHOLEST/HDLC SERPL: 2.8 RATIO
COLOR: COLORLESS
EOSINOPHIL # BLD AUTO: 0.16 K/UL
EOSINOPHIL NFR BLD AUTO: 0.4 %
FOLATE SERPL-MCNC: >20 NG/ML
GLUCOSE QUALITATIVE U: NEGATIVE
HCT VFR BLD CALC: 41.7 %
HDLC SERPL-MCNC: 69 MG/DL
HGB BLD-MCNC: 13.3 G/DL
IMM GRANULOCYTES NFR BLD AUTO: 0.1 %
KETONES URINE: NEGATIVE
LDLC SERPL CALC-MCNC: 110 MG/DL
LEUKOCYTE ESTERASE URINE: NEGATIVE
LYMPHOCYTES # BLD AUTO: 32.35 K/UL
LYMPHOCYTES NFR BLD AUTO: 90.8 %
MAN DIFF?: NORMAL
MCHC RBC-ENTMCNC: 31.4 PG
MCHC RBC-ENTMCNC: 31.9 GM/DL
MCV RBC AUTO: 98.6 FL
MONOCYTES # BLD AUTO: 0.41 K/UL
MONOCYTES NFR BLD AUTO: 1.2 %
NEUTROPHILS # BLD AUTO: 2.56 K/UL
NEUTROPHILS NFR BLD AUTO: 7.2 %
NITRITE URINE: NEGATIVE
PH URINE: 7.5
PLATELET # BLD AUTO: 167 K/UL
PROTEIN URINE: NEGATIVE
RBC # BLD: 4.23 M/UL
RBC # FLD: 13.8 %
SPECIFIC GRAVITY URINE: 1
T4 FREE SERPL-MCNC: 1.3 NG/DL
TRIGL SERPL-MCNC: 75 MG/DL
TSH SERPL-ACNC: 2.55 UIU/ML
UROBILINOGEN URINE: NORMAL
VIT B12 SERPL-MCNC: 1891 PG/ML
WBC # FLD AUTO: 35.63 K/UL

## 2020-10-15 PROCEDURE — 99214 OFFICE O/P EST MOD 30 MIN: CPT

## 2020-10-15 NOTE — COUNSELING
[Encouraged to increase physical activity] : Encouraged to increase physical activity [FreeTextEntry2] : maintain weight

## 2020-10-15 NOTE — HISTORY OF PRESENT ILLNESS
[FreeTextEntry1] : Annual exam [de-identified] : 55-year-old female presents for annual exam complaining of severe knee pain bilaterally for months, waking her at night. She saw orthopedist at [facility] who did not find any abnormalities, prescribed physical therapy which patient said hurt her back and didn't help her knees. She now has an appointment with a back specialist. Patient denies knee swelling/redness. She states that when she was living in [place] about 4 years ago she walked long distances daily without any problem but the area was flat. When she moved and the area was much more hilly she started to develop knee pain. She is very resistant to taking any medication\par \par Denies chest pain shortness of breath palpitations dizziness

## 2020-10-15 NOTE — PHYSICAL EXAM
[Normal] : no joint swelling, grossly normal strength/tone [Normal Female:] : bladder was normal on palpation [de-identified] : Deferred GYN; Denies pain, lumps and discharge [FreeTextEntry1] : Deferred GI/GYN [de-identified] : No lymphadenopathy [de-identified] : Denies excessive thirst, urination, fatigue [de-identified] : No rash or skin lesion [de-identified] : Alert and Oriented x3.  Appropriate mood and affect

## 2020-10-15 NOTE — PLAN
[FreeTextEntry1] : 55-year-old female is noted presents for annual exam complaining of knee pain. Advised to take 2 Tylenol 3 times a day for a week and see how she feels. She refuses any NSAIDs and is not even sure she wants to take that much Tylenol, she is very concerned about liver damage. 2 reassure her she will come in for lab work shortly after she finishes the one week of Tylenol. As noted she has an appointment with a back specialist\par \par Up-to-date with screenings N. vaccines\par \par Review diet and encourage regular aerobic exercise\par \par Call one week to review labs

## 2020-10-15 NOTE — DATA REVIEWED
[FreeTextEntry1] : Sinus  Bradycardia 56\par - Negative precordial T-waves. \par WITHIN NORMAL LIMITS

## 2020-10-15 NOTE — REVIEW OF SYSTEMS
[Joint Pain] : joint pain [Joint Stiffness] : joint stiffness [Negative] : Heme/Lymph [Joint Swelling] : no joint swelling [Muscle Weakness] : no muscle weakness [FreeTextEntry9] : bilateral knee pain

## 2020-10-15 NOTE — HEALTH RISK ASSESSMENT
[Very Good] : ~his/her~  mood as very good [No] : No [Never (0 pts)] : Never (0 points) [No falls in past year] : Patient reported no falls in the past year [0] : 2) Feeling down, depressed, or hopeless: Not at all (0) [] : No [[id number]] : 0

## 2020-10-16 LAB
SARS-COV-2 IGG SERPL IA-ACNC: 56.4 AU/ML
SARS-COV-2 IGG SERPL QL IA: POSITIVE

## 2020-10-17 ENCOUNTER — TRANSCRIPTION ENCOUNTER (OUTPATIENT)
Age: 55
End: 2020-10-17

## 2020-10-19 ENCOUNTER — TRANSCRIPTION ENCOUNTER (OUTPATIENT)
Age: 55
End: 2020-10-19

## 2020-10-20 ENCOUNTER — TRANSCRIPTION ENCOUNTER (OUTPATIENT)
Age: 55
End: 2020-10-20

## 2020-10-20 ENCOUNTER — APPOINTMENT (OUTPATIENT)
Dept: OBGYN | Facility: CLINIC | Age: 55
End: 2020-10-20

## 2020-10-23 ENCOUNTER — TRANSCRIPTION ENCOUNTER (OUTPATIENT)
Age: 55
End: 2020-10-23

## 2020-10-23 NOTE — HISTORY OF PRESENT ILLNESS
[Disease:__________________________] : Disease: [unfilled] [de-identified] : 54 year old female presents today for initial consultation of CLL, transfer of care from Dr. Mcguire.  Patient was diagnosed with CLL, Gomes stage 0 in .  Patient presented to MD upon diagnosis to PCP,  as she was feeling very tired and fatigued and her WBC continually was elevated on several occasions.  She was then referred to hematologist and diagnosed with CLL.  She never had BM BX- or imagining studies.  Last CBC 2020- WNL? Pertinent positives include night sweats, no unexplained weight loss, no recent infections.  \par \par Patient has a history of anorexia and OP- was not able to tolerate Fosamax, last BMD was within 2 years.  \par \par Fathers was adopted- unknown family history, mother  from brain bleed after being on anticoagulation for a provoked DVT, also has  history of rectal and bladder cancer.\par \par Patient c/o fatigue, hair loss.  Feels " not herself", decrease stamina, Patient with anorexia nervosa for many years since 80-ties, was  told she prediabetic.  Weight decreased in the last 6 months, not under the care of PCP as just moved to [place] 1 month ago.  [de-identified] : Patient presents today for follow up of CLL. Mammogram 8/4/2020- no evidence of malignancy needed recall due to bilateral axillary LN's- she was recalled today and was told it was negative. Patient tearful.\par Patient with severe pain in both knees, at night and day.  Wakes up at night 5-6 times per night.  Tylenol alleviates the pain for 3 h. She c/o physically and mentally tired.

## 2020-10-23 NOTE — ASSESSMENT
[FreeTextEntry1] : 55 year old female diagnosed with CLL stage "0" with average count of 35k, denies fever, occasional night sweats, most likely menopausal, weigh decline intermittently but variable due to her eating disorder.\par Patient diagnosed 2014\par She c/o fatigue, feels depressed. \par FISH - 13q14 deletion, reviewed with patient results, overall good prognostic profile.\par WBC 40 k with ALC 35k and stable remaining heme parameters.\par Continue to monitor the counts q 4-6 months.\par \par Patient c/o b/l knee pain, seen by orthopedic group ( [facility]).\par [facility] to [facility]. \par Patient to see rheumatology.  \par Offered gabapentin- rx send to pharmacy. \par \par IgA depleted, reviewed with patient\par \par Patient with high level of anxiety, tearful.  involved to contact psychiatry NP who works with patient. \par \par \par \par

## 2020-10-26 ENCOUNTER — TRANSCRIPTION ENCOUNTER (OUTPATIENT)
Age: 55
End: 2020-10-26

## 2020-10-27 ENCOUNTER — TRANSCRIPTION ENCOUNTER (OUTPATIENT)
Age: 55
End: 2020-10-27

## 2020-10-29 ENCOUNTER — TRANSCRIPTION ENCOUNTER (OUTPATIENT)
Age: 55
End: 2020-10-29

## 2020-11-02 ENCOUNTER — TRANSCRIPTION ENCOUNTER (OUTPATIENT)
Age: 55
End: 2020-11-02

## 2020-11-03 ENCOUNTER — APPOINTMENT (OUTPATIENT)
Dept: INTERNAL MEDICINE | Facility: CLINIC | Age: 55
End: 2020-11-03
Payer: MEDICARE

## 2020-11-03 PROCEDURE — 36415 COLL VENOUS BLD VENIPUNCTURE: CPT

## 2020-11-04 ENCOUNTER — APPOINTMENT (OUTPATIENT)
Dept: GASTROENTEROLOGY | Facility: CLINIC | Age: 55
End: 2020-11-04

## 2020-11-04 LAB
ALBUMIN SERPL ELPH-MCNC: 5 G/DL
ALP BLD-CCNC: 78 U/L
ALT SERPL-CCNC: 36 U/L
ANION GAP SERPL CALC-SCNC: 10 MMOL/L
AST SERPL-CCNC: 31 U/L
BILIRUB SERPL-MCNC: 0.3 MG/DL
BUN SERPL-MCNC: 12 MG/DL
CALCIUM SERPL-MCNC: 10 MG/DL
CHLORIDE SERPL-SCNC: 97 MMOL/L
CO2 SERPL-SCNC: 30 MMOL/L
CREAT SERPL-MCNC: 0.52 MG/DL
GLUCOSE SERPL-MCNC: 92 MG/DL
POTASSIUM SERPL-SCNC: 4.4 MMOL/L
PROT SERPL-MCNC: 7.1 G/DL
SODIUM SERPL-SCNC: 137 MMOL/L

## 2020-11-12 ENCOUNTER — APPOINTMENT (OUTPATIENT)
Dept: OBGYN | Facility: CLINIC | Age: 55
End: 2020-11-12
Payer: MEDICARE

## 2020-11-12 VITALS
HEIGHT: 62 IN | TEMPERATURE: 97.1 F | DIASTOLIC BLOOD PRESSURE: 60 MMHG | WEIGHT: 5 LBS | SYSTOLIC BLOOD PRESSURE: 100 MMHG | BODY MASS INDEX: 0.92 KG/M2

## 2020-11-12 DIAGNOSIS — Z01.419 ENCOUNTER FOR GYNECOLOGICAL EXAMINATION (GENERAL) (ROUTINE) W/OUT ABNORMAL FINDINGS: ICD-10-CM

## 2020-11-12 PROCEDURE — G0101: CPT

## 2020-12-09 ENCOUNTER — APPOINTMENT (OUTPATIENT)
Dept: INTERNAL MEDICINE | Facility: CLINIC | Age: 55
End: 2020-12-09
Payer: MEDICARE

## 2020-12-09 DIAGNOSIS — R94.5 ABNORMAL RESULTS OF LIVER FUNCTION STUDIES: ICD-10-CM

## 2020-12-09 PROCEDURE — 36415 COLL VENOUS BLD VENIPUNCTURE: CPT

## 2020-12-10 LAB
ALBUMIN SERPL ELPH-MCNC: 5 G/DL
ALP BLD-CCNC: 83 U/L
ALT SERPL-CCNC: 41 U/L
ANION GAP SERPL CALC-SCNC: 11 MMOL/L
AST SERPL-CCNC: 37 U/L
BILIRUB SERPL-MCNC: 0.4 MG/DL
BUN SERPL-MCNC: 12 MG/DL
CALCIUM SERPL-MCNC: 10 MG/DL
CHLORIDE SERPL-SCNC: 99 MMOL/L
CO2 SERPL-SCNC: 29 MMOL/L
CREAT SERPL-MCNC: 0.68 MG/DL
GLUCOSE SERPL-MCNC: 93 MG/DL
POTASSIUM SERPL-SCNC: 4.6 MMOL/L
PROT SERPL-MCNC: 7.1 G/DL
SODIUM SERPL-SCNC: 139 MMOL/L

## 2020-12-23 PROBLEM — Z01.419 ENCOUNTER FOR ANNUAL ROUTINE GYNECOLOGICAL EXAMINATION: Status: RESOLVED | Noted: 2020-11-12 | Resolved: 2020-12-23

## 2020-12-23 PROBLEM — B37.3 VULVAR CANDIDIASIS: Status: RESOLVED | Noted: 2020-10-07 | Resolved: 2020-12-23

## 2021-01-14 ENCOUNTER — APPOINTMENT (OUTPATIENT)
Dept: HEMATOLOGY ONCOLOGY | Facility: CLINIC | Age: 56
End: 2021-01-14

## 2021-02-10 ENCOUNTER — LABORATORY RESULT (OUTPATIENT)
Age: 56
End: 2021-02-10

## 2021-02-10 ENCOUNTER — APPOINTMENT (OUTPATIENT)
Dept: INTERNAL MEDICINE | Facility: CLINIC | Age: 56
End: 2021-02-10
Payer: MEDICARE

## 2021-02-10 VITALS — BODY MASS INDEX: 17.74 KG/M2 | WEIGHT: 97 LBS | SYSTOLIC BLOOD PRESSURE: 98 MMHG | DIASTOLIC BLOOD PRESSURE: 70 MMHG

## 2021-02-10 DIAGNOSIS — M25.569 PAIN IN UNSPECIFIED KNEE: ICD-10-CM

## 2021-02-10 DIAGNOSIS — M54.41 LUMBAGO WITH SCIATICA, RIGHT SIDE: ICD-10-CM

## 2021-02-10 PROCEDURE — 36415 COLL VENOUS BLD VENIPUNCTURE: CPT

## 2021-02-10 PROCEDURE — 99213 OFFICE O/P EST LOW 20 MIN: CPT | Mod: 25

## 2021-02-10 RX ORDER — MOMETASONE FUROATE MONOHYDRATE 50 UG/1
50 SPRAY, METERED NASAL
Refills: 0 | Status: DISCONTINUED | COMMUNITY
End: 2021-02-10

## 2021-02-10 RX ORDER — GABAPENTIN 100 MG/1
100 CAPSULE ORAL 3 TIMES DAILY
Qty: 30 | Refills: 0 | Status: DISCONTINUED | COMMUNITY
Start: 2020-10-15 | End: 2021-02-10

## 2021-02-10 NOTE — REVIEW OF SYSTEMS
[Fatigue] : fatigue [Joint Pain] : joint pain [Back Pain] : back pain [Hair Changes] : hair changes [Insomnia] : insomnia [Anxiety] : anxiety [Negative] : Heme/Lymph [Nail Changes] : no nail changes [Skin Rash] : no skin rash [FreeTextEntry9] : bilateral knee tendinitis, Right-sided sciatica

## 2021-02-10 NOTE — HISTORY OF PRESENT ILLNESS
[FreeTextEntry8] : 55-year-old very anxious female presents with multiple complaints, all of which she is seeing a specialist for. She seen a psych nurse practitioner who has her on Klonopin and she advised her to see her PCP to see if there was anything physically wrong. Patient was in the middle of dental work at [facility] and she had to wait 5 hours yesterday which made her very anxious. Dental problem is making it difficult to eat\par \par Complaining of lower back pain with right sciatica for which he seeing spine specialist who tries to go to physical therapy. She's also complaining of knee pain, diagnosed with tendinitis by orthopedist who also sent her to physical therapy. She states physical therapist made her back worse\par \par She is also complaining about hair loss and fatigue which is complained about many times before, blood work normal, repeated today

## 2021-02-10 NOTE — PLAN
[FreeTextEntry1] : 55-year-old very anxious female as noted presents with multiple complaints all of which were discussed in detail. Lab work repeated for hair loss and fatigue but also advised patient hair loss is more likely due to genetics, stress, hairstyle.\par Advise patient to have her psych nurse practitioner call me to discuss her symptoms i.e. anxiety, panic.\par \par Referred patient back to her spine specialist regarding her back pain and advised her to make an appointment with a different physical therapist if the first one was not helpful. Also may consider seeing pain management.Advised her to return to orthopedist regarding her knees and he can find a different physical therapist. She states orthopedist told her not to do anything because of the tendinitis in her knees but sitting home on the sofa doing nothing has increased her anxiety tremendously, hopefully at least going to physical therapy regularly would be helpful\par I referred her back to her dentist regarding dental problems but also discussed at length diet including soft foods, protein drinks,  Ensure/boost in order to get through dental procedures\par Patient repeatedly states she needs help, she's not suicidal, she wants something done now for the way she feels. Advised her to call her psych NP because the majority of her symptoms are mental health issues. She will call next week to review labs.

## 2021-02-10 NOTE — PHYSICAL EXAM
[No Acute Distress] : no acute distress [Well Nourished] : well nourished [Well Developed] : well developed [Normal Sclera/Conjunctiva] : normal sclera/conjunctiva [PERRL] : pupils equal round and reactive to light [EOMI] : extraocular movements intact [Normal Outer Ear/Nose] : the outer ears and nose were normal in appearance [Normal Oropharynx] : the oropharynx was normal [No JVD] : no jugular venous distention [No Lymphadenopathy] : no lymphadenopathy [Supple] : supple [Thyroid Normal, No Nodules] : the thyroid was normal and there were no nodules present [No Respiratory Distress] : no respiratory distress  [No Accessory Muscle Use] : no accessory muscle use [Clear to Auscultation] : lungs were clear to auscultation bilaterally [Normal] : no respiratory distress, lungs were clear to auscultation bilaterally and no accessory muscle use [Normal Rate] : normal rate  [Regular Rhythm] : with a regular rhythm [Normal S1, S2] : normal S1 and S2 [No Murmur] : no murmur heard [No Carotid Bruits] : no carotid bruits [No Abdominal Bruit] : a ~M bruit was not heard ~T in the abdomen [No Varicosities] : no varicosities [Pedal Pulses Present] : the pedal pulses are present [No Edema] : there was no peripheral edema [No Palpable Aorta] : no palpable aorta [No Extremity Clubbing/Cyanosis] : no extremity clubbing/cyanosis [Soft] : abdomen soft [Non Tender] : non-tender [Non-distended] : non-distended [No Masses] : no abdominal mass palpated [No HSM] : no HSM [Normal Bowel Sounds] : normal bowel sounds [Normal Posterior Cervical Nodes] : no posterior cervical lymphadenopathy [Normal Anterior Cervical Nodes] : no anterior cervical lymphadenopathy [No Spinal Tenderness] : no spinal tenderness [No CVA Tenderness] : no CVA  tenderness [No Joint Swelling] : no joint swelling [Grossly Normal Strength/Tone] : grossly normal strength/tone [No Rash] : no rash [Coordination Grossly Intact] : coordination grossly intact [No Focal Deficits] : no focal deficits [Normal Gait] : normal gait [Deep Tendon Reflexes (DTR)] : deep tendon reflexes were 2+ and symmetric [Speech Grossly Normal] : speech grossly normal [Alert and Oriented x3] : oriented to person, place, and time [Normal Insight/Judgement] : insight and judgment were intact [de-identified] : Very anxious appearing [de-identified] : hyperventilating [de-identified] : Extremely anxious

## 2021-02-18 ENCOUNTER — NON-APPOINTMENT (OUTPATIENT)
Age: 56
End: 2021-02-18

## 2021-02-24 ENCOUNTER — NON-APPOINTMENT (OUTPATIENT)
Age: 56
End: 2021-02-24

## 2021-02-25 ENCOUNTER — RESULT REVIEW (OUTPATIENT)
Age: 56
End: 2021-02-25

## 2021-02-25 ENCOUNTER — NON-APPOINTMENT (OUTPATIENT)
Age: 56
End: 2021-02-25

## 2021-02-25 ENCOUNTER — APPOINTMENT (OUTPATIENT)
Dept: HEMATOLOGY ONCOLOGY | Facility: CLINIC | Age: 56
End: 2021-02-25
Payer: MEDICARE

## 2021-02-25 VITALS
HEIGHT: 62.4 IN | OXYGEN SATURATION: 98 % | RESPIRATION RATE: 18 BRPM | BODY MASS INDEX: 17.17 KG/M2 | HEART RATE: 83 BPM | WEIGHT: 94.5 LBS | DIASTOLIC BLOOD PRESSURE: 59 MMHG | TEMPERATURE: 98.2 F | SYSTOLIC BLOOD PRESSURE: 95 MMHG

## 2021-02-25 DIAGNOSIS — K58.9 IRRITABLE BOWEL SYNDROME W/OUT DIARRHEA: ICD-10-CM

## 2021-02-25 DIAGNOSIS — F41.1 GENERALIZED ANXIETY DISORDER: ICD-10-CM

## 2021-02-25 DIAGNOSIS — L65.9 NONSCARRING HAIR LOSS, UNSPECIFIED: ICD-10-CM

## 2021-02-25 DIAGNOSIS — R63.0 ANOREXIA: ICD-10-CM

## 2021-02-25 PROCEDURE — 99214 OFFICE O/P EST MOD 30 MIN: CPT

## 2021-02-25 RX ORDER — FAMOTIDINE 20 MG/1
20 TABLET, FILM COATED ORAL DAILY
Qty: 30 | Refills: 0 | Status: ACTIVE | COMMUNITY
Start: 2021-02-25 | End: 1900-01-01

## 2021-02-25 NOTE — HISTORY OF PRESENT ILLNESS
[Disease:__________________________] : Disease: [unfilled] [de-identified] : 54 year old female presents today for initial consultation of CLL, transfer of care from Dr. Mcguire.  Patient was diagnosed with CLL, Gomes stage 0 in .  Patient presented to MD upon diagnosis to PCP,  as she was feeling very tired and fatigued and her WBC continually was elevated on several occasions.  She was then referred to hematologist and diagnosed with CLL.  She never had BM BX- or imagining studies.  Last CBC 2020- WNL? Pertinent positives include night sweats, no unexplained weight loss, no recent infections.  \par \par Patient has a history of anorexia and OP- was not able to tolerate Fosamax, last BMD was within 2 years.  \par \par Fathers was adopted- unknown family history, mother  from brain bleed after being on anticoagulation for a provoked DVT, also has  history of rectal and bladder cancer.\par \par Patient c/o fatigue, hair loss.  Feels " not herself", decrease stamina, Patient with anorexia nervosa for many years since 80-ties, was  told she prediabetic.  Weight decreased in the last 6 months, not under the care of PCP as just moved to [place] 1 month ago.  [de-identified] : Patient presents today for follow up of CLL. Mammogram 8/4/2020- no evidence of malignancy needed recall due to bilateral axillary LN's- she was recalled today and was told it was negative. Patient tearful.\par Patient with severe pain in both knees, at night and day.  Wakes up at night 5-6 times per night.  Tylenol alleviates the pain for 3 h. She c/o physically and mentally tired.

## 2021-02-25 NOTE — ASSESSMENT
[FreeTextEntry1] : 55 year old female diagnosed with CLL stage "0" with average count of 35k, denies fever, occasional night sweats, most likely menopausal, weigh decline intermittently but variable due to her eating disorder.\par Patient diagnosed 2014\par She c/o fatigue, feels depressed. \par FISH - 13q14 deletion, reviewed with patient results, overall good prognostic profile.\par WBC 40 k with ALC 35k and stable remaining heme parameters.\par Continue to monitor the counts q 4-6 months.\par \par Patient c/o b/l knee pain, seen by orthopedic group ( [facility]).\par [place] [facility] [place]. \par Patient to see rheumatology.  \par Offered gabapentin- rx send to pharmacy. \par \par IgA depleted, reviewed with patient\par \par Patient with high level of anxiety, tearful.  involved to contact psychiatry NP who works with patient.\par \par 02/25/21\par in [place] [facility] Cp worked up and discharged from ER,  chest pain repeated few days later- cardiac workup in ER negative \par "I am  not myself, I'm so drained" my leg aches- following up with pain mgmt next week   \par weight loss\par dental work not healing- to follow up with dentist \par very fatigued - ferritin 40 from 81\par WBC 54.6 - continue to monitor hgb/ plts WNL \par to see rheum for arthralgias \par GERD- trial of Pepcid - does not have true allergy to Zantac has follow up with GI \par \par case and mgmt discussed with Dr. Luna- return in 3 weeks- cbc, chem, iron at next visit \par \par \par \par

## 2021-03-01 ENCOUNTER — RESULT REVIEW (OUTPATIENT)
Age: 56
End: 2021-03-01

## 2021-03-01 ENCOUNTER — APPOINTMENT (OUTPATIENT)
Dept: PAIN MANAGEMENT | Facility: CLINIC | Age: 56
End: 2021-03-01
Payer: MEDICARE

## 2021-03-01 VITALS
BODY MASS INDEX: 17.3 KG/M2 | DIASTOLIC BLOOD PRESSURE: 60 MMHG | WEIGHT: 94 LBS | HEIGHT: 62 IN | TEMPERATURE: 98.2 F | SYSTOLIC BLOOD PRESSURE: 100 MMHG

## 2021-03-01 DIAGNOSIS — F41.0 GENERALIZED ANXIETY DISORDER: ICD-10-CM

## 2021-03-01 DIAGNOSIS — F41.1 GENERALIZED ANXIETY DISORDER: ICD-10-CM

## 2021-03-01 PROCEDURE — 99204 OFFICE O/P NEW MOD 45 MIN: CPT

## 2021-03-01 NOTE — REVIEW OF SYSTEMS
[Abdominal Pain] : abdominal pain [Constipation] : constipation [Diarrhea] : diarrhea [Joint Pain] : joint pain [Dizziness] : dizziness [Anxiety] : anxiety [Depression] : depression [Negative] : Heme/Lymph [FreeTextEntry2] : fatigue,weight loss [FreeTextEntry5] : palpitations [de-identified] : frequent urination

## 2021-03-01 NOTE — ASSESSMENT
[FreeTextEntry1] : 55 yof w/ CLL presents for consultation for chronic pain.  About one year ago she developed severe low back pain. Walking up hills worsened her knee and leg pain. Physical therapy has made her pain worse. The patient has failed to have relief with over six weeks of physical therapy within the last three months and all medications. GIven their failure to improve with all other conservative measures recommend MRI lumbar spine. Patient will return to review imaging and plan for potential intervention. Xray bilateral knees. RTC to review imaging.

## 2021-03-01 NOTE — PHYSICAL EXAM
[de-identified] : Constitutional: Well-developed, in no acute distress\par Eyes: Pupil- Right: normal, Left: normal\par Neck exam: Inspection normal\par Respiratory: Normal effort, speaking in full sentences\par Cardiovascular: Regular rate and rhythm\par Vascular: Dorsal pedis pulses normal and equal bilaterally\par Abdomen: Inspection normal, no distension\par Skin: Inspection normal, no bruising noted\par Musculoskeletal:\par Lumbar Spine:   Gait: Antalgic\par 		Inspection: Normal curvature, no abnormal kyphosis or scoliosis\par 		Facet loading: pain bilaterally\par 		Palpation:\par 			Lumbar and paraspinal muscles: pain bilaterally\par 			Sacroiliac joint: no pain\par 			Greater trochanter: no pain\par 		Muscle Strength:\par 		Iliopsoas: 5/5 bilaterally\par 		Quadriceps: 5/5 bilaterally\par 		Hamstrings: 5/5 bilaterally\par 		Tibialis anterior: 5/5 bilaterally\par 		Extensor hallucis longus: 5/5 bilaterally\par \par 		Sensation: normal and equal in bilateral lower extremities\par \par 		Reflexes:\par 			Patellar reflex: 2+ bilaterally\par 			Ankle jerk reflex: 2+ bilaterally\par 		\par 		Straight leg raise: negative bilaterally\par \par Extremity: no edema noted\par Neurological: Memory normal, AAO x 3, Cranial nerves II - XII grossly normal\par Psychiatric: Appropriate mood and affect, oriented to time, place, person, and situation

## 2021-03-01 NOTE — HISTORY OF PRESENT ILLNESS
[___ yrs] : [unfilled] year(s) ago [Constant] : constant [10] : a maximum pain level of 10/10 [Sharp] : sharp [Dull] : dull [Aching] : aching [Standing] : standing [Heat] : heat [Ice] : ice [FreeTextEntry1] : 55 yof w/ CLL presents for consultation for chronic pain. Her pain began around 2017 when she moved to [place]. She was undergoing pelvic floor rehab. About one year ago she developed severe low back pain. Walking up hills worsened her knee and leg pain. Reports fatigue. Physical therapy has made her pain worse. [FreeTextEntry7] : Pain Buttocks right and left knees and thighs  [de-identified] : cramps [FreeTextEntry3] : climbing hills  [FreeTextEntry4] : Gel

## 2021-03-05 ENCOUNTER — RESULT REVIEW (OUTPATIENT)
Age: 56
End: 2021-03-05

## 2021-03-11 ENCOUNTER — APPOINTMENT (OUTPATIENT)
Dept: PAIN MANAGEMENT | Facility: CLINIC | Age: 56
End: 2021-03-11
Payer: MEDICARE

## 2021-03-11 VITALS
TEMPERATURE: 98 F | SYSTOLIC BLOOD PRESSURE: 100 MMHG | WEIGHT: 94 LBS | DIASTOLIC BLOOD PRESSURE: 62 MMHG | HEIGHT: 62 IN | BODY MASS INDEX: 17.3 KG/M2

## 2021-03-11 DIAGNOSIS — M25.562 PAIN IN RIGHT KNEE: ICD-10-CM

## 2021-03-11 DIAGNOSIS — M25.561 PAIN IN RIGHT KNEE: ICD-10-CM

## 2021-03-11 PROCEDURE — 99214 OFFICE O/P EST MOD 30 MIN: CPT

## 2021-03-11 RX ORDER — LIDOCAINE 5% 700 MG/1
5 PATCH TOPICAL
Qty: 30 | Refills: 0 | Status: ACTIVE | COMMUNITY
Start: 2021-03-11 | End: 1900-01-01

## 2021-03-11 RX ORDER — DICLOFENAC SODIUM 1% 10 MG/G
1 GEL TOPICAL DAILY
Qty: 1 | Refills: 0 | Status: ACTIVE | COMMUNITY
Start: 2021-03-11 | End: 1900-01-01

## 2021-03-11 NOTE — ASSESSMENT
[FreeTextEntry1] : 55 yof w/ CLL presents for consultation for chronic pain.  I have personally reviewed the patient's MRI in detail and discussed it with them which is significant for mild stenosis. Recommend aqua therapy. Diclofenac gel. RTC one month.

## 2021-03-11 NOTE — HISTORY OF PRESENT ILLNESS
[10 (pain as bad as you can imagine)] : 1. What number best describes your pain on average in the past week? 10/10 pain [10 (completely interferes)] : 3. What number best describes how, during the past week, pain has interfered with your general activity? 10/10 pain [___ yrs] : [unfilled] year(s) ago [Constant] : constant [10] : a maximum pain level of 10/10 [Sharp] : sharp [Dull] : dull [Aching] : aching [Standing] : standing [Heat] : heat [Ice] : ice [FreeTextEntry1] : As per my note dated 03/01/21: "55 yof w/ CLL presents for consultation for chronic pain. Her pain began around 2017 when she moved to [place]. She was undergoing pelvic floor rehab. About one year ago she developed severe low back pain. Walking up hills worsened her knee and leg pain. Reports fatigue. Physical therapy has made her pain worse."\par \par Pt returns for follow-up today, 03/11/21. She returns to review MRI. Her pain remains in her low back and knees. She wakes up at night with knee pain. Wishes to walk. Is using diclofenac gel.  [FreeTextEntry2] : 30 [FreeTextEntry7] : Pain Buttocks right and left knees and thighs  [de-identified] : cramps [FreeTextEntry3] : climbing hills  [FreeTextEntry4] : Gel

## 2021-03-11 NOTE — PHYSICAL EXAM
[de-identified] : Constitutional: Well-developed, in no acute distress\par Eyes: Pupil- Right: normal, Left: normal\par Neck exam: Inspection normal\par Respiratory: Normal effort, speaking in full sentences\par Cardiovascular: Regular rate and rhythm\par Vascular: Dorsal pedis pulses normal and equal bilaterally\par Abdomen: Inspection normal, no distension\par Skin: Inspection normal, no bruising noted\par Musculoskeletal:\par Lumbar Spine:   Gait: Antalgic\par 		Inspection: Normal curvature, no abnormal kyphosis or scoliosis\par 		Facet loading: pain bilaterally\par 		Palpation:\par 			Lumbar and paraspinal muscles: pain bilaterally\par 			Sacroiliac joint: no pain\par 			Greater trochanter: no pain\par 		Muscle Strength:\par 		Iliopsoas: 5/5 bilaterally\par 		Quadriceps: 5/5 bilaterally\par 		Hamstrings: 5/5 bilaterally\par 		Tibialis anterior: 5/5 bilaterally\par 		Extensor hallucis longus: 5/5 bilaterally\par \par 		Sensation: normal and equal in bilateral lower extremities\par \par 		Reflexes:\par 			Patellar reflex: 2+ bilaterally\par 			Ankle jerk reflex: 2+ bilaterally\par 		\par 		Straight leg raise: negative bilaterally\par \par Extremity: no edema noted\par Neurological: Memory normal, AAO x 3, Cranial nerves II - XII grossly normal\par Psychiatric: Appropriate mood and affect, oriented to time, place, person, and situation

## 2021-03-11 NOTE — DATA REVIEWED
[FreeTextEntry1] :  03/05/21\par MRI LUMBAR SPINE\par \par  There is no evidence for transitional lumbosacral anatomy.\par \par  There is exaggeration of the lumbar lordosis. There is no significant spondylolisthesis. The lumbar\par vertebral bodies heights are maintained. The vertebral body bone marrow is within normal limits. No\par suspicious expansile or destructive osseous lesion is identified within the lumbar spine. The\par intervertebral disc spaces are maintained. There is multilevel intervertebral disc desiccation. No\par abnormal cord signal is identified. The cauda equina nerve roots and conus medullaris are normal in\par appearance. The conus medullaris terminates at the L1 level.\par \par  No significant periarticular or paraspinal soft tissue edema is identified. The visualized\par abdominal aorta is normal in caliber. Visualized paraspinal soft tissues are grossly unremarkable.\par \par  There are multilevel degenerative changes as follows with comparison made to the August 3 2020 MR\par lumbar spine:\par \par  T12-L1: No significant canal or foraminal stenosis, unchanged.\par \par  L1-L2: Mild facet arthropathy without significant canal or foraminal stenosis, unchanged.\par \par  L2-L3: Mild facet arthropathy without significant canal or foraminal stenosis, unchanged.\par \par  L3-L4: Mild facet arthropathy and disc bulge contributing to mild canal stenosis without\par significant neural foraminal stenosis, unchanged.\par \par  L4-L5: Bilateral facet arthropathy and small disc bulge with facet effusions contributing to mild\par canal stenosis and mild bilateral neuroforaminal stenosis, unchanged.\par \par  L5-S1: Bilateral mild facet arthropathy and central disc protrusion contributing to mild bilateral\par neural foraminal stenosis without significant canal stenosis, unchanged.\par \par \par \par  IMPRESSION:\par \par  Mild multilevel degenerative changes of the lumbar spine as detailed above most prominently at the\par L4-L5 level where there is mild canal stenosis and mild bilateral neuroforaminal stenosis without\par significant change from the August 3, 2020 exam.

## 2021-03-11 NOTE — REVIEW OF SYSTEMS
[Abdominal Pain] : abdominal pain [Constipation] : constipation [Diarrhea] : diarrhea [Joint Pain] : joint pain [Dizziness] : dizziness [Anxiety] : anxiety [Depression] : depression [Negative] : Heme/Lymph [FreeTextEntry2] : fatigue,weight loss [FreeTextEntry5] : palpitations [de-identified] : frequent urination

## 2021-03-31 ENCOUNTER — APPOINTMENT (OUTPATIENT)
Dept: PAIN MANAGEMENT | Facility: CLINIC | Age: 56
End: 2021-03-31
Payer: MEDICARE

## 2021-03-31 VITALS
SYSTOLIC BLOOD PRESSURE: 100 MMHG | BODY MASS INDEX: 17.3 KG/M2 | WEIGHT: 94 LBS | DIASTOLIC BLOOD PRESSURE: 80 MMHG | TEMPERATURE: 98.2 F | HEIGHT: 62 IN

## 2021-03-31 DIAGNOSIS — M25.561 PAIN IN RIGHT KNEE: ICD-10-CM

## 2021-03-31 DIAGNOSIS — G89.29 PAIN IN RIGHT KNEE: ICD-10-CM

## 2021-03-31 DIAGNOSIS — M25.562 PAIN IN RIGHT KNEE: ICD-10-CM

## 2021-03-31 DIAGNOSIS — R53.83 OTHER FATIGUE: ICD-10-CM

## 2021-03-31 PROCEDURE — 99214 OFFICE O/P EST MOD 30 MIN: CPT

## 2021-03-31 NOTE — ASSESSMENT
[FreeTextEntry1] : 55 yof w/ CLL presents for consultation for chronic pain.  I have personally reviewed the patient's MRI in detail and discussed it with them which is significant for mild stenosis. She is seeing rheumatology, she will follow-up with me after. Aqua therapy and diclofenac gel.

## 2021-03-31 NOTE — HISTORY OF PRESENT ILLNESS
[10 (pain as bad as you can imagine)] : 1. What number best describes your pain on average in the past week? 10/10 pain [10 (completely interferes)] : 3. What number best describes how, during the past week, pain has interfered with your general activity? 10/10 pain [___ yrs] : [unfilled] year(s) ago [Constant] : constant [10] : a maximum pain level of 10/10 [Sharp] : sharp [Dull] : dull [Aching] : aching [Standing] : standing [Heat] : heat [Ice] : ice [FreeTextEntry1] : As per my note dated 03/01/21: "55 yof w/ CLL presents for consultation for chronic pain. Her pain began around 2017 when she moved to [place]. She was undergoing pelvic floor rehab. About one year ago she developed severe low back pain. Walking up hills worsened her knee and leg pain. Reports fatigue. Physical therapy has made her pain worse."\par \par As per my note dated, 03/11/21. "She returns to review MRI. Her pain remains in her low back and knees. She wakes up at night with knee pain. Wishes to walk. Is using diclofenac gel."\par \par Pt returns for follow-up today, 03/31/21. Her pain is spreading. Reports pain in her mid back. Pain is in her feet. She is seeing rheumatology next week. She is very concerned.  [FreeTextEntry2] : 28 [FreeTextEntry7] : Pain Buttocks right and left knees and thighs  [de-identified] : cramps [FreeTextEntry3] : climbing hills  [FreeTextEntry4] : Gel

## 2021-03-31 NOTE — REVIEW OF SYSTEMS
[Abdominal Pain] : abdominal pain [Constipation] : constipation [Diarrhea] : diarrhea [Joint Pain] : joint pain [Dizziness] : dizziness [Anxiety] : anxiety [Depression] : depression [Negative] : Heme/Lymph [FreeTextEntry2] : fatigue,weight loss [FreeTextEntry5] : palpitations [de-identified] : frequent urination

## 2021-03-31 NOTE — PHYSICAL EXAM
[de-identified] : Constitutional: Well-developed, in no acute distress\par Eyes: Pupil- Right: normal, Left: normal\par Neck exam: Inspection normal\par Respiratory: Normal effort, speaking in full sentences\par Cardiovascular: Regular rate and rhythm\par Vascular: Dorsal pedis pulses normal and equal bilaterally\par Abdomen: Inspection normal, no distension\par Skin: Inspection normal, no bruising noted\par Musculoskeletal:\par Lumbar Spine:   Gait: Antalgic\par 		Inspection: Normal curvature, no abnormal kyphosis or scoliosis\par 		Facet loading: pain bilaterally\par 		Palpation:\par 			Lumbar and paraspinal muscles: pain bilaterally\par 			Sacroiliac joint: no pain\par 			Greater trochanter: no pain\par 		Muscle Strength:\par 		Iliopsoas: 5/5 bilaterally\par 		Quadriceps: 5/5 bilaterally\par 		Hamstrings: 5/5 bilaterally\par 		Tibialis anterior: 5/5 bilaterally\par 		Extensor hallucis longus: 5/5 bilaterally\par \par 		Sensation: normal and equal in bilateral lower extremities\par \par 		Reflexes:\par 			Patellar reflex: 2+ bilaterally\par 			Ankle jerk reflex: 2+ bilaterally\par 		\par 		Straight leg raise: negative bilaterally\par \par Extremity: no edema noted\par Neurological: Memory normal, AAO x 3, Cranial nerves II - XII grossly normal\par Psychiatric: Appropriate mood and affect, oriented to time, place, person, and situation

## 2021-04-05 ENCOUNTER — APPOINTMENT (OUTPATIENT)
Dept: INTERNAL MEDICINE | Facility: CLINIC | Age: 56
End: 2021-04-05

## 2021-04-07 ENCOUNTER — NON-APPOINTMENT (OUTPATIENT)
Age: 56
End: 2021-04-07

## 2021-04-08 ENCOUNTER — NON-APPOINTMENT (OUTPATIENT)
Age: 56
End: 2021-04-08

## 2021-04-09 ENCOUNTER — APPOINTMENT (OUTPATIENT)
Dept: INTERNAL MEDICINE | Facility: CLINIC | Age: 56
End: 2021-04-09

## 2021-04-20 ENCOUNTER — APPOINTMENT (OUTPATIENT)
Dept: OBGYN | Facility: CLINIC | Age: 56
End: 2021-04-20

## 2021-04-22 ENCOUNTER — APPOINTMENT (OUTPATIENT)
Dept: INTERNAL MEDICINE | Facility: CLINIC | Age: 56
End: 2021-04-22
Payer: MEDICARE

## 2021-04-22 VITALS
SYSTOLIC BLOOD PRESSURE: 104 MMHG | HEART RATE: 82 BPM | OXYGEN SATURATION: 99 % | BODY MASS INDEX: 17.3 KG/M2 | WEIGHT: 94 LBS | DIASTOLIC BLOOD PRESSURE: 68 MMHG | HEIGHT: 62 IN

## 2021-04-22 DIAGNOSIS — J45.909 UNSPECIFIED ASTHMA, UNCOMPLICATED: ICD-10-CM

## 2021-04-22 DIAGNOSIS — M54.16 RADICULOPATHY, LUMBAR REGION: ICD-10-CM

## 2021-04-22 DIAGNOSIS — C91.10 CHRONIC LYMPHOCYTIC LEUKEMIA OF B-CELL TYPE NOT HAVING ACHIEVED REMISSION: ICD-10-CM

## 2021-04-22 DIAGNOSIS — G89.4 CHRONIC PAIN SYNDROME: ICD-10-CM

## 2021-04-22 PROCEDURE — 99215 OFFICE O/P EST HI 40 MIN: CPT

## 2021-04-22 RX ORDER — ALBUTEROL SULFATE 90 UG/1
108 (90 BASE) INHALANT RESPIRATORY (INHALATION) EVERY 6 HOURS
Qty: 1 | Refills: 0 | Status: ACTIVE | COMMUNITY
Start: 1900-01-01 | End: 1900-01-01

## 2021-04-23 PROBLEM — C91.10 CLL (CHRONIC LYMPHOCYTIC LEUKEMIA): Status: ACTIVE | Noted: 2020-07-08

## 2021-04-23 PROBLEM — G89.4 CHRONIC PAIN SYNDROME: Status: ACTIVE | Noted: 2021-03-01

## 2021-04-23 PROBLEM — M54.16 LUMBAR RADICULOPATHY, CHRONIC: Status: ACTIVE | Noted: 2021-03-01

## 2021-04-25 ENCOUNTER — TRANSCRIPTION ENCOUNTER (OUTPATIENT)
Age: 56
End: 2021-04-25

## 2021-06-03 ENCOUNTER — APPOINTMENT (OUTPATIENT)
Dept: HEMATOLOGY ONCOLOGY | Facility: CLINIC | Age: 56
End: 2021-06-03

## 2021-06-22 ENCOUNTER — APPOINTMENT (OUTPATIENT)
Dept: ENDOCRINOLOGY | Facility: CLINIC | Age: 56
End: 2021-06-22

## 2021-10-21 ENCOUNTER — APPOINTMENT (OUTPATIENT)
Dept: INTERNAL MEDICINE | Facility: CLINIC | Age: 56
End: 2021-10-21

## 2021-10-29 ENCOUNTER — APPOINTMENT (OUTPATIENT)
Dept: INTERNAL MEDICINE | Facility: CLINIC | Age: 56
End: 2021-10-29

## 2021-11-08 ENCOUNTER — RX RENEWAL (OUTPATIENT)
Age: 56
End: 2021-11-08

## 2021-11-11 ENCOUNTER — APPOINTMENT (OUTPATIENT)
Dept: OBGYN | Facility: CLINIC | Age: 56
End: 2021-11-11

## 2024-01-09 NOTE — PHYSICAL EXAM
[General Appearance - Alert] : alert [General Appearance - In No Acute Distress] : in no acute distress [Sclera] : the sclera and conjunctiva were normal [Outer Ear] : the ears and nose were normal in appearance [Neck Appearance] : the appearance of the neck was normal [] : no respiratory distress [Abdomen Soft] : soft [Apical Impulse] : the apical impulse was normal [Abdomen Tenderness] : non-tender [Abnormal Walk] : normal gait [Skin Color & Pigmentation] : normal skin color and pigmentation [Cranial Nerves] : cranial nerves 2-12 were intact [Oriented To Time, Place, And Person] : oriented to person, place, and time [FreeTextEntry1] : deferred Time-based billing (NON-critical care)